# Patient Record
Sex: FEMALE | Race: WHITE | NOT HISPANIC OR LATINO | Employment: FULL TIME | URBAN - METROPOLITAN AREA
[De-identification: names, ages, dates, MRNs, and addresses within clinical notes are randomized per-mention and may not be internally consistent; named-entity substitution may affect disease eponyms.]

---

## 2021-07-15 ENCOUNTER — HOSPITAL ENCOUNTER (EMERGENCY)
Facility: HOSPITAL | Age: 21
Discharge: HOME | End: 2021-07-16
Attending: EMERGENCY MEDICINE | Admitting: EMERGENCY MEDICINE

## 2021-07-15 DIAGNOSIS — B00.2 PRIMARY HERPES SIMPLEX WITH GINGIVOSTOMATITIS: Primary | ICD-10-CM

## 2021-07-15 PROCEDURE — 87255 GENET VIRUS ISOLATE HSV: CPT | Performed by: EMERGENCY MEDICINE

## 2021-07-15 PROCEDURE — 99283 EMERGENCY DEPT VISIT LOW MDM: CPT

## 2021-07-15 RX ORDER — LIDOCAINE HYDROCHLORIDE 20 MG/ML
15 SOLUTION OROPHARYNGEAL ONCE
Status: COMPLETED | OUTPATIENT
Start: 2021-07-15 | End: 2021-07-16

## 2021-07-15 RX ORDER — HYDROCODONE BITARTRATE AND ACETAMINOPHEN 5; 325 MG/1; MG/1
1 TABLET ORAL EVERY 6 HOURS PRN
Status: DISCONTINUED | OUTPATIENT
Start: 2021-07-15 | End: 2021-07-16 | Stop reason: HOSPADM

## 2021-07-15 RX ORDER — VALACYCLOVIR HYDROCHLORIDE 500 MG/1
1000 TABLET, FILM COATED ORAL ONCE
Status: COMPLETED | OUTPATIENT
Start: 2021-07-15 | End: 2021-07-16

## 2021-07-16 VITALS
DIASTOLIC BLOOD PRESSURE: 75 MMHG | RESPIRATION RATE: 18 BRPM | WEIGHT: 201.5 LBS | HEIGHT: 67 IN | SYSTOLIC BLOOD PRESSURE: 119 MMHG | OXYGEN SATURATION: 100 % | BODY MASS INDEX: 31.63 KG/M2 | HEART RATE: 80 BPM | TEMPERATURE: 97.8 F

## 2021-07-16 RX ORDER — VALACYCLOVIR HYDROCHLORIDE 1 G/1
1000 TABLET, FILM COATED ORAL 3 TIMES DAILY
Qty: 15 TABLET | Refills: 0 | Status: SHIPPED | OUTPATIENT
Start: 2021-07-16 | End: 2021-07-21

## 2021-07-16 RX ORDER — HYDROCODONE BITARTRATE AND ACETAMINOPHEN 5; 325 MG/1; MG/1
1 TABLET ORAL EVERY 6 HOURS PRN
Qty: 12 TABLET | Refills: 0 | Status: SHIPPED | OUTPATIENT
Start: 2021-07-16

## 2021-07-16 RX ADMIN — HYDROCODONE BITARTRATE AND ACETAMINOPHEN 1 TABLET: 5; 325 TABLET ORAL at 00:03

## 2021-07-16 RX ADMIN — VALACYCLOVIR HYDROCHLORIDE 1000 MG: 500 TABLET, FILM COATED ORAL at 00:03

## 2021-07-16 RX ADMIN — LIDOCAINE HYDROCHLORIDE 15 ML: 20 SOLUTION ORAL; TOPICAL at 00:04

## 2021-07-16 NOTE — ED TRIAGE NOTES
Pt reports that she started taking PCN after her symptoms started after having her wisdom teeth removed.

## 2021-07-16 NOTE — ED PROVIDER NOTES
Time: 11:12 PM EDT  Arrived by: private car  Chief Complaint:   Chief Complaint   Patient presents with   • Oral Swelling     History provided by: Patient  History is limited by: N/A     History of Present Illness:  Patient is a 20 y.o. year old female that presents to the emergency department with mouth pain that began a few days ago. The patient has pain in her lips and tongue. She also complains of a subjective fever but denies cough, congestion, nausea, vomiting, or diarrhea. She went to the ER and was referred to the dentist and sent home with penicillin. Her dentist told her that her pain does not seem to be caused from her wisdom teeth. She went to her PCP's office and was told she had a throat infection.     Onset of Symptoms: 2 days ago  Timing: intermittent Symptoms unchanged   Location: Mouth  Quality: Pain  Severity: moderate  Modifying factors: Drinking, eating  Associated signs/symptoms: Subjective fevers    Patient Care Team  Primary Care Provider: Victoria Mcpherson APRN    Past Medical History:     Allergies   Allergen Reactions   • Penicillins Swelling     Past Medical History:   Diagnosis Date   • Asthma      Past Surgical History:   Procedure Laterality Date   • ADENOIDECTOMY     • TONSILLECTOMY       History reviewed. No pertinent family history.    Home Medications:  Prior to Admission medications    Medication Sig Start Date End Date Taking? Authorizing Provider   diclofenac sodium (VOTAREN XR) 100 MG 24 hr tablet Take 100 mg by mouth Daily.   Yes Provider, MD Wilmar        Social History:   Social History     Tobacco Use   • Smoking status: Never Smoker   • Smokeless tobacco: Never Used   Vaping Use   • Vaping Use: Never assessed   Substance Use Topics   • Alcohol use: Never   • Drug use: Never       Record Review:  I have reviewed the patient's records in [facility].     Review of Systems:  Review of Systems   Constitutional: Negative for chills and fever.   HENT: Negative for congestion,  "ear pain and sore throat.         Mouth pain   Eyes: Negative for pain.   Respiratory: Negative for cough, chest tightness and shortness of breath.    Cardiovascular: Negative for chest pain.   Gastrointestinal: Negative for abdominal pain, diarrhea, nausea and vomiting.   Genitourinary: Negative for flank pain and hematuria.   Musculoskeletal: Negative for joint swelling.   Skin: Negative for pallor.   Neurological: Negative for seizures and headaches.   All other systems reviewed and are negative.       Physical Exam:  /75 (BP Location: Left arm, Patient Position: Sitting)   Pulse 80   Temp 97.8 °F (36.6 °C) (Oral)   Resp 18   Ht 170.2 cm (67\")   Wt 91.4 kg (201 lb 8 oz)   LMP 07/12/2021   SpO2 100%   BMI 31.56 kg/m²     Physical Exam  Vitals and nursing note reviewed.   Constitutional:       General: She is not in acute distress.     Appearance: Normal appearance. She is not toxic-appearing.      Comments: Uncomfortable due to pain   HENT:      Head: Normocephalic and atraumatic.      Mouth/Throat:      Mouth: Mucous membranes are moist.      Comments: There are multiple small 0.3cm whitish ulcerations in the mouth and tongue with an erythematous base. There is crusting and fever blisters on the lower lip. No tonsillitis.   Eyes:      Extraocular Movements: Extraocular movements intact.      Pupils: Pupils are equal, round, and reactive to light.   Cardiovascular:      Rate and Rhythm: Normal rate and regular rhythm.      Pulses: Normal pulses.      Heart sounds: Normal heart sounds.   Pulmonary:      Effort: Pulmonary effort is normal. No respiratory distress.      Breath sounds: Normal breath sounds.   Abdominal:      General: Abdomen is flat.      Palpations: Abdomen is soft.      Tenderness: There is no abdominal tenderness.   Musculoskeletal:         General: Normal range of motion.      Cervical back: Normal range of motion and neck supple.   Lymphadenopathy:      Cervical: No cervical " adenopathy.   Skin:     General: Skin is warm and dry.   Neurological:      Mental Status: She is alert and oriented to person, place, and time. Mental status is at baseline.                Medications in the Emergency Department:  Medications   HYDROcodone-acetaminophen (NORCO) 5-325 MG per tablet 1 tablet (1 tablet Oral Given 7/16/21 0003)   Lidocaine Viscous HCl (XYLOCAINE) 2 % mouth solution 15 mL (15 mL Mouth/Throat Given 7/16/21 0004)   valACYclovir (VALTREX) tablet 1,000 mg (1,000 mg Oral Given 7/16/21 0003)        Labs  Lab Results (last 24 hours)     Procedure Component Value Units Date/Time    Herpes Simplex Virus Culture - Swab, Oropharynx [[id number]] Collected: 07/15/21 2359    Specimen: Swab from Oropharynx Updated: 07/16/21 0016           Imaging:  No orders to display       Procedures:  Procedures    Progress                            Medical Decision Making:  MDM     This patient is a healthy appearing 20-year-old female who presents with painful mouth and lip ulcerations for the past couple of days, and also some subjective fevers and tender lymph nodes in the neck.    On my physical exam, it looks like she has herpes labialis of the lower lip and multiple small whitish ulcers of the mouth including under the tongue and of the mucosal surface of the lower lip and of the hard and soft palate, all with erythematous base, concerning for herpes simplex virus gingivostomatitis.    She was already prescribed Magic mouthwash at urgent care.    I am starting her on Valtrex antiviral and also some pain medicine here and performed a oral swab to test for HSV 1.    We will discussed supportive care instructions and she looks stable for discharge home.        Final diagnoses:   Primary herpes simplex with gingivostomatitis        Disposition:  ED Disposition     ED Disposition Condition Comment    Discharge Stable           Documentation assistance provided by Tho Washburn acting as scribe for Eliza,  MD Dony. Information recorded by the scribe was done at my direction and has been verified and validated by me.      Tho Washburn  07/15/21 8925       Dony Kay MD  07/16/21 2129

## 2021-07-19 LAB — HSV SPEC CULT: POSITIVE

## 2021-07-20 NOTE — ED PROVIDER NOTES
Subjective   History of Present Illness    Review of Systems    Past Medical History:   Diagnosis Date   • Asthma        Allergies   Allergen Reactions   • Penicillins Swelling       Past Surgical History:   Procedure Laterality Date   • ADENOIDECTOMY     • TONSILLECTOMY         History reviewed. No pertinent family history.    Social History     Socioeconomic History   • Marital status: Single     Spouse name: Not on file   • Number of children: Not on file   • Years of education: Not on file   • Highest education level: Not on file   Tobacco Use   • Smoking status: Never Smoker   • Smokeless tobacco: Never Used   Vaping Use   • Vaping Use: Never assessed   Substance and Sexual Activity   • Alcohol use: Never   • Drug use: Never   • Sexual activity: Defer           Objective   Physical Exam    Procedures           ED Course                                           MDM    Final diagnoses:   Primary herpes simplex with gingivostomatitis       ED Disposition  ED Disposition     ED Disposition Condition Comment    Discharge Stable           Victoria Mcpherson, APRN  [address] [facility] [address] [phone]    Call in 2 days  As needed, If symptoms worsen         Medication List      New Prescriptions    HYDROcodone-acetaminophen 5-325 MG per tablet  Commonly known as: NORCO  Take 1 tablet by mouth Every 6 (Six) Hours As Needed for Severe Pain .     valACYclovir 1000 MG tablet  Commonly known as: VALTREX  Take 1 tablet by mouth 3 (Three) Times a Day for 5 days.        Stop    diclofenac sodium 100 MG 24 hr tablet  Commonly known as: VOTAREN XR           Where to Get Your Medications      These medications were sent to [id number] - [place], [place] - [address] [facility] [address] [facility] [address] - [phone]  - [phone] FX  [address] [address]    Phone: [phone]   · HYDROcodone-acetaminophen 5-325 MG per tablet  · valACYclovir 1000 MG tablet